# Patient Record
Sex: MALE | Race: WHITE | Employment: FULL TIME | URBAN - METROPOLITAN AREA
[De-identification: names, ages, dates, MRNs, and addresses within clinical notes are randomized per-mention and may not be internally consistent; named-entity substitution may affect disease eponyms.]

---

## 2017-02-28 ENCOUNTER — MED REC SCAN ONLY (OUTPATIENT)
Dept: FAMILY MEDICINE CLINIC | Facility: CLINIC | Age: 47
End: 2017-02-28

## 2017-04-03 ENCOUNTER — TELEPHONE (OUTPATIENT)
Dept: FAMILY MEDICINE CLINIC | Facility: CLINIC | Age: 47
End: 2017-04-03

## 2017-05-17 ENCOUNTER — OFFICE VISIT (OUTPATIENT)
Dept: FAMILY MEDICINE CLINIC | Facility: CLINIC | Age: 47
End: 2017-05-17

## 2017-05-17 VITALS
HEIGHT: 69.5 IN | RESPIRATION RATE: 14 BRPM | WEIGHT: 271 LBS | HEART RATE: 60 BPM | BODY MASS INDEX: 39.24 KG/M2 | TEMPERATURE: 98 F | DIASTOLIC BLOOD PRESSURE: 84 MMHG | SYSTOLIC BLOOD PRESSURE: 128 MMHG

## 2017-05-17 DIAGNOSIS — K40.90 INGUINAL HERNIA, LEFT: Primary | ICD-10-CM

## 2017-05-17 DIAGNOSIS — R10.9 ABDOMINAL PAIN, LEFT LATERAL: ICD-10-CM

## 2017-05-17 PROCEDURE — 99213 OFFICE O/P EST LOW 20 MIN: CPT | Performed by: FAMILY MEDICINE

## 2017-05-17 RX ORDER — PREDNISOLONE ACETATE 10 MG/ML
SUSPENSION/ DROPS OPHTHALMIC 4 TIMES DAILY
COMMUNITY

## 2017-05-17 NOTE — PROGRESS NOTES
Tere Chaparro is a 48year old male.   HPI:   Richard Castle has had some left inguinal pain after  He did some lifting and remodeling and has since then has had LLQ abd pain and some nausea, but it does not last very long, no previous injury or issues his j (Temporal)  Resp 14  Ht 69.5\"  Wt 271 lb  BMI 39.46 kg/m2  GENERAL: well developed, well nourished,in no apparent distress  SKIN: no rashes,no suspicious lesions  HEENT: atraumatic, normocephalic,ears and throat are clear  NECK: supple,no adenopathy,no br

## 2017-05-19 ENCOUNTER — TELEPHONE (OUTPATIENT)
Dept: FAMILY MEDICINE CLINIC | Facility: CLINIC | Age: 47
End: 2017-05-19

## 2017-05-19 ENCOUNTER — OFFICE VISIT (OUTPATIENT)
Dept: FAMILY MEDICINE CLINIC | Facility: CLINIC | Age: 47
End: 2017-05-19

## 2017-05-19 ENCOUNTER — TELEPHONE (OUTPATIENT)
Dept: SURGERY | Facility: CLINIC | Age: 47
End: 2017-05-19

## 2017-05-19 VITALS
SYSTOLIC BLOOD PRESSURE: 130 MMHG | WEIGHT: 275 LBS | BODY MASS INDEX: 40 KG/M2 | DIASTOLIC BLOOD PRESSURE: 106 MMHG | TEMPERATURE: 98 F

## 2017-05-19 DIAGNOSIS — K40.90 UNILATERAL INGUINAL HERNIA WITHOUT OBSTRUCTION OR GANGRENE, RECURRENCE NOT SPECIFIED: Primary | ICD-10-CM

## 2017-05-19 DIAGNOSIS — K40.90 NON-RECURRENT UNILATERAL INGUINAL HERNIA WITHOUT OBSTRUCTION OR GANGRENE: Primary | ICD-10-CM

## 2017-05-19 PROCEDURE — 99244 OFF/OP CNSLTJ NEW/EST MOD 40: CPT | Performed by: SURGERY

## 2017-05-19 NOTE — PROGRESS NOTES
Naseem Flores is a 48year old male  Patient presents with:  Consult: Dr Delbert Fritz. Consult for L inguinal hernia. Pt was lifting and twisting about a week ago and had pain. Pain today is at a 5.        REFERRED BY    Patient presents with Left inguinal h 0.0 oz/week       0 Standard drinks or equivalent per week       Comment: Social      ROS     GENERAL HEALTH: otherwise feels well, no weight loss, no fever or chills  SKIN: denies any unusual skin rashes or jaundice  HEENT: denies nasal congesti GFR Date: 08/11/2016   Value: 83  Ref Range >=60 Status: Final    Comment:   Estimated GFR units: mL/min/1.73 square meters   eGFR calculated by the CKD-EPI equation.        Calcium, Total Date: 08/11/2016   Value: 8.9  Ref Range 8.3-10.3 mg/dL Status: Fi Ratio Date: 08/11/2016   Value: 6.50* Ref Range <4.97 Status: Final    Comment:   Interpretation of CHOL/HDL ratios:      Male:          Female:          Risk:      3.43           3.27             One half average      4.97           4.44             Carlos 0. 10-0.60 x10(3) uL Status: Final   Eosinophil Absolute Date: 08/11/2016   Value: 0.08  Ref Range 0.00-0.30 x10(3) uL Status: Final   Basophil Absolute Date: 08/11/2016   Value: 0.04  Ref Range 0.00-0.10 x10(3) uL Status: Final   Immature Granulocyte Absol

## 2017-05-26 ENCOUNTER — TELEPHONE (OUTPATIENT)
Dept: SURGERY | Facility: CLINIC | Age: 47
End: 2017-05-26

## 2017-05-26 ENCOUNTER — TELEPHONE (OUTPATIENT)
Dept: FAMILY MEDICINE CLINIC | Facility: CLINIC | Age: 47
End: 2017-05-26

## 2017-05-26 NOTE — TELEPHONE ENCOUNTER
CPT: [id number], [id number], [id number]  DX: K40.90, K42.9    I called [facility] and spoke to [facility]. The above CPT codes do not require auth.  Ref # is [id number]. Martha Greenberg

## 2017-05-30 ENCOUNTER — CHARTING TRANS (OUTPATIENT)
Dept: OTHER | Age: 47
End: 2017-05-30

## 2017-05-30 RX ORDER — CLINDAMYCIN PHOSPHATE 900 MG/50ML
900 INJECTION INTRAVENOUS ONCE
Status: CANCELLED | OUTPATIENT
Start: 2017-05-30 | End: 2017-05-30

## 2017-06-01 ENCOUNTER — APPOINTMENT (OUTPATIENT)
Dept: LAB | Age: 47
End: 2017-06-01
Payer: COMMERCIAL

## 2017-06-01 DIAGNOSIS — K42.9 UMBILICAL HERNIA: ICD-10-CM

## 2017-06-01 PROCEDURE — 80048 BASIC METABOLIC PNL TOTAL CA: CPT

## 2017-06-01 PROCEDURE — 93005 ELECTROCARDIOGRAM TRACING: CPT

## 2017-06-01 PROCEDURE — 36415 COLL VENOUS BLD VENIPUNCTURE: CPT

## 2017-06-01 PROCEDURE — 93010 ELECTROCARDIOGRAM REPORT: CPT | Performed by: INTERNAL MEDICINE

## 2017-06-12 ENCOUNTER — ANESTHESIA (OUTPATIENT)
Dept: SURGERY | Facility: HOSPITAL | Age: 47
End: 2017-06-12
Payer: COMMERCIAL

## 2017-06-12 ENCOUNTER — HOSPITAL ENCOUNTER (OUTPATIENT)
Facility: HOSPITAL | Age: 47
Setting detail: HOSPITAL OUTPATIENT SURGERY
Discharge: HOME | End: 2017-06-12
Attending: SURGERY | Admitting: SURGERY
Payer: COMMERCIAL

## 2017-06-12 ENCOUNTER — ANESTHESIA EVENT (OUTPATIENT)
Dept: SURGERY | Facility: HOSPITAL | Age: 47
End: 2017-06-12
Payer: COMMERCIAL

## 2017-06-12 ENCOUNTER — SURGERY (OUTPATIENT)
Age: 47
End: 2017-06-12

## 2017-06-12 VITALS
HEIGHT: 70 IN | BODY MASS INDEX: 39.37 KG/M2 | DIASTOLIC BLOOD PRESSURE: 76 MMHG | OXYGEN SATURATION: 95 % | TEMPERATURE: 98 F | HEART RATE: 61 BPM | RESPIRATION RATE: 18 BRPM | SYSTOLIC BLOOD PRESSURE: 110 MMHG | WEIGHT: 275 LBS

## 2017-06-12 DIAGNOSIS — K40.90 NON-RECURRENT UNILATERAL INGUINAL HERNIA WITHOUT OBSTRUCTION OR GANGRENE: ICD-10-CM

## 2017-06-12 DIAGNOSIS — K42.9 UMBILICAL HERNIA: Primary | ICD-10-CM

## 2017-06-12 PROCEDURE — 0VBG4ZZ EXCISION OF LEFT SPERMATIC CORD, PERCUTANEOUS ENDOSCOPIC APPROACH: ICD-10-PCS | Performed by: SURGERY

## 2017-06-12 PROCEDURE — 0YU64JZ SUPPLEMENT LEFT INGUINAL REGION WITH SYNTHETIC SUBSTITUTE, PERCUTANEOUS ENDOSCOPIC APPROACH: ICD-10-PCS | Performed by: SURGERY

## 2017-06-12 PROCEDURE — 0WQF0ZZ REPAIR ABDOMINAL WALL, OPEN APPROACH: ICD-10-PCS | Performed by: SURGERY

## 2017-06-12 DEVICE — BARD MESH
Type: IMPLANTABLE DEVICE | Site: GROIN | Status: FUNCTIONAL
Brand: BARD MESH

## 2017-06-12 RX ORDER — ONDANSETRON 2 MG/ML
4 INJECTION INTRAMUSCULAR; INTRAVENOUS AS NEEDED
Status: DISCONTINUED | OUTPATIENT
Start: 2017-06-12 | End: 2017-06-12

## 2017-06-12 RX ORDER — SODIUM CHLORIDE, SODIUM LACTATE, POTASSIUM CHLORIDE, CALCIUM CHLORIDE 600; 310; 30; 20 MG/100ML; MG/100ML; MG/100ML; MG/100ML
INJECTION, SOLUTION INTRAVENOUS CONTINUOUS
Status: DISCONTINUED | OUTPATIENT
Start: 2017-06-12 | End: 2017-06-12

## 2017-06-12 RX ORDER — HYDROCODONE BITARTRATE AND ACETAMINOPHEN 5; 325 MG/1; MG/1
2 TABLET ORAL EVERY 4 HOURS PRN
Status: DISCONTINUED | OUTPATIENT
Start: 2017-06-12 | End: 2017-06-12

## 2017-06-12 RX ORDER — METOCLOPRAMIDE HYDROCHLORIDE 5 MG/ML
INJECTION INTRAMUSCULAR; INTRAVENOUS
Status: DISCONTINUED | OUTPATIENT
Start: 2017-06-12 | End: 2017-06-12 | Stop reason: HOSPADM

## 2017-06-12 RX ORDER — HYDROCODONE BITARTRATE AND ACETAMINOPHEN 5; 325 MG/1; MG/1
1 TABLET ORAL EVERY 4 HOURS PRN
Status: DISCONTINUED | OUTPATIENT
Start: 2017-06-12 | End: 2017-06-12

## 2017-06-12 RX ORDER — KETOROLAC TROMETHAMINE 30 MG/ML
30 INJECTION, SOLUTION INTRAMUSCULAR; INTRAVENOUS EVERY 6 HOURS PRN
Status: DISCONTINUED | OUTPATIENT
Start: 2017-06-12 | End: 2017-06-12

## 2017-06-12 RX ORDER — BUPIVACAINE HYDROCHLORIDE 5 MG/ML
INJECTION, SOLUTION EPIDURAL; INTRACAUDAL AS NEEDED
Status: DISCONTINUED | OUTPATIENT
Start: 2017-06-12 | End: 2017-06-12 | Stop reason: HOSPADM

## 2017-06-12 RX ORDER — MIDAZOLAM HYDROCHLORIDE 1 MG/ML
1 INJECTION INTRAMUSCULAR; INTRAVENOUS EVERY 5 MIN PRN
Status: DISCONTINUED | OUTPATIENT
Start: 2017-06-12 | End: 2017-06-12

## 2017-06-12 RX ORDER — AMLODIPINE BESYLATE 5 MG/1
5 TABLET ORAL DAILY
COMMUNITY

## 2017-06-12 RX ORDER — NALOXONE HYDROCHLORIDE 0.4 MG/ML
80 INJECTION, SOLUTION INTRAMUSCULAR; INTRAVENOUS; SUBCUTANEOUS AS NEEDED
Status: DISCONTINUED | OUTPATIENT
Start: 2017-06-12 | End: 2017-06-12

## 2017-06-12 RX ORDER — HYDROMORPHONE HYDROCHLORIDE 1 MG/ML
INJECTION, SOLUTION INTRAMUSCULAR; INTRAVENOUS; SUBCUTANEOUS
Status: COMPLETED
Start: 2017-06-12 | End: 2017-06-12

## 2017-06-12 RX ORDER — HEPARIN SODIUM 5000 [USP'U]/ML
5000 INJECTION, SOLUTION INTRAVENOUS; SUBCUTANEOUS ONCE
Status: COMPLETED | OUTPATIENT
Start: 2017-06-12 | End: 2017-06-12

## 2017-06-12 RX ORDER — HYDROCODONE BITARTRATE AND ACETAMINOPHEN 5; 325 MG/1; MG/1
1-2 TABLET ORAL
Qty: 30 TABLET | Refills: 0 | Status: SHIPPED | OUTPATIENT
Start: 2017-06-12 | End: 2017-06-27 | Stop reason: ALTCHOICE

## 2017-06-12 RX ORDER — ONDANSETRON 2 MG/ML
INJECTION INTRAMUSCULAR; INTRAVENOUS
Status: DISCONTINUED | OUTPATIENT
Start: 2017-06-12 | End: 2017-06-12 | Stop reason: HOSPADM

## 2017-06-12 RX ORDER — HYDROCODONE BITARTRATE AND ACETAMINOPHEN 5; 325 MG/1; MG/1
TABLET ORAL
Status: DISCONTINUED
Start: 2017-06-12 | End: 2017-06-12

## 2017-06-12 RX ORDER — KETOROLAC TROMETHAMINE 30 MG/ML
15 INJECTION, SOLUTION INTRAMUSCULAR; INTRAVENOUS EVERY 6 HOURS PRN
Status: DISCONTINUED | OUTPATIENT
Start: 2017-06-12 | End: 2017-06-12

## 2017-06-12 RX ORDER — DEXAMETHASONE SODIUM PHOSPHATE 4 MG/ML
VIAL (ML) INJECTION
Status: DISCONTINUED | OUTPATIENT
Start: 2017-06-12 | End: 2017-06-12 | Stop reason: HOSPADM

## 2017-06-12 RX ORDER — HYDROMORPHONE HYDROCHLORIDE 1 MG/ML
0.4 INJECTION, SOLUTION INTRAMUSCULAR; INTRAVENOUS; SUBCUTANEOUS EVERY 5 MIN PRN
Status: DISCONTINUED | OUTPATIENT
Start: 2017-06-12 | End: 2017-06-12

## 2017-06-12 RX ORDER — MEPERIDINE HYDROCHLORIDE 25 MG/ML
12.5 INJECTION INTRAMUSCULAR; INTRAVENOUS; SUBCUTANEOUS AS NEEDED
Status: DISCONTINUED | OUTPATIENT
Start: 2017-06-12 | End: 2017-06-12

## 2017-06-12 NOTE — BRIEF OP NOTE
Pre-Operative Diagnosis: Non-recurrent unilateral inguinal hernia without obstruction or gangrene [K40.90]     Post-Operative Diagnosis: Non-recurrent unilateral inguinal hernia without obstruction or gangrene [K40.90]     Procedure Performed:   Procedu
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
## 2017-06-12 NOTE — H&P
Patient presents with Left inguinal hernia present for the past week and half  Pt was doing remodeling when the hernia occurred    He denies bulge    Also states he has an umbilical hernia present for many years    States the left groin hernia gives him feels well, no weight loss, no fever or chills  SKIN: denies any unusual skin rashes or jaundice  HEENT: denies nasal congestion, sinus pain or sore throat; hearing loss negative,    EYES , no diplopia or vision changes  RESPIRATORY: denies shortness of br    Estimated GFR units: mL/min/1.73 square meters           eGFR calculated by the CKD-EPI equation.        Calcium, Total  Date:  08/11/2016    Value:  8.9   Ref Range  8.3-10.3 mg/dL  Status:  Final      Comment:    Total Calciums are not corrected for e Date:  08/11/2016    Value:  59*  Ref Range  5-40 mg/dL  Status:  Final    Chol/HDL Ratio  Date:  08/11/2016    Value:  6.50*  Ref Range  <4.97  Status:  Final      Comment:    Interpretation of CHOL/HDL ratios:                                            08/11/2016    Value:  40.6   Ref Range  35.1-46.3 fL  Status:  Final    Neutrophil Absolute Prelim  Date:  08/11/2016    Value:  2.86   Ref Range  1.30-6.70 x10 (3) uL  Status:  Final    Neutrophil Absolute  Date:  08/11/2016    Value:  2.86   Ref Range  1

## 2017-06-12 NOTE — OPERATIVE REPORT
[address]    PATIENT'S NAME: Flip Lyn MALIA DESEAN   ATTENDING PHYSICIAN: Jennyfer Almazan D.O.   OPERATING PHYSICIAN: Jennyfer Almazan D.O.   PATIENT ACCOUNT#:   [de-identified]    LOCATION:  [address] [place] [address] [address] #:   [id number] point approximately 3 cm lateral to the internal ring. The preperitoneal space was developed and bluntly dissected medial to the level of the pubic tubercle and then Cedric's ligament down to the femoral vein.   There was no evidence of direct hernial defe

## 2017-06-12 NOTE — ANESTHESIA POSTPROCEDURE EVALUATION
[address] Patient Status:  Hospital Outpatient Surgery   Age/Gender 48year old male MRN [id number]   [facility] SURGERY Attending Chas Officer, [facility] Day # 0 PARRIS Mcfadden DO       Anesthesia Post-op Not

## 2017-06-15 ENCOUNTER — TELEPHONE (OUTPATIENT)
Dept: FAMILY MEDICINE CLINIC | Facility: CLINIC | Age: 47
End: 2017-06-15

## 2017-06-19 ENCOUNTER — TELEPHONE (OUTPATIENT)
Dept: FAMILY MEDICINE CLINIC | Facility: CLINIC | Age: 47
End: 2017-06-19

## 2017-06-20 ENCOUNTER — OFFICE VISIT (OUTPATIENT)
Dept: FAMILY MEDICINE CLINIC | Facility: CLINIC | Age: 47
End: 2017-06-20

## 2017-06-20 VITALS
DIASTOLIC BLOOD PRESSURE: 90 MMHG | BODY MASS INDEX: 39 KG/M2 | SYSTOLIC BLOOD PRESSURE: 120 MMHG | WEIGHT: 275 LBS | TEMPERATURE: 98 F

## 2017-06-20 DIAGNOSIS — K40.90 INGUINAL HERNIA WITHOUT OBSTRUCTION OR GANGRENE, RECURRENCE NOT SPECIFIED, UNSPECIFIED LATERALITY: Primary | ICD-10-CM

## 2017-06-20 PROCEDURE — 99024 POSTOP FOLLOW-UP VISIT: CPT | Performed by: SURGERY

## 2017-06-20 NOTE — PROGRESS NOTES
6/20/2017    Patient presents with:  Post-Op: S/p L inguinal herniorrhaphy. Healing well, denies fevers or d/c. [place] Tierney is status post a laparoscopic repair of a L inguinal hernia.  Patient is doing well with some mild incisional and inguin

## 2017-06-27 ENCOUNTER — OFFICE VISIT (OUTPATIENT)
Dept: FAMILY MEDICINE CLINIC | Facility: CLINIC | Age: 47
End: 2017-06-27

## 2017-06-27 VITALS
TEMPERATURE: 98 F | HEIGHT: 70 IN | WEIGHT: 266 LBS | BODY MASS INDEX: 38.08 KG/M2 | DIASTOLIC BLOOD PRESSURE: 62 MMHG | SYSTOLIC BLOOD PRESSURE: 110 MMHG

## 2017-06-27 DIAGNOSIS — K40.90 INGUINAL HERNIA WITHOUT OBSTRUCTION OR GANGRENE, RECURRENCE NOT SPECIFIED, UNSPECIFIED LATERALITY: Primary | ICD-10-CM

## 2017-06-27 PROCEDURE — 99024 POSTOP FOLLOW-UP VISIT: CPT | Performed by: SURGERY

## 2017-06-27 RX ORDER — BRINZOLAMIDE/BRIMONIDINE TARTRATE 10; 2 MG/ML; MG/ML
1 SUSPENSION/ DROPS OPHTHALMIC 3 TIMES DAILY
Refills: 1 | COMMUNITY
Start: 2017-05-25

## 2017-06-27 NOTE — PROGRESS NOTES
Patient complains of left groin pain following abdominal extension and twisting movement.   States this occurred a few days ago and has been improving since then but the pain has persisted to some degree on physical examination there is no evidence of recur

## 2017-08-18 ENCOUNTER — OFFICE VISIT (OUTPATIENT)
Dept: FAMILY MEDICINE CLINIC | Facility: CLINIC | Age: 47
End: 2017-08-18

## 2017-08-18 VITALS
WEIGHT: 265 LBS | TEMPERATURE: 98 F | BODY MASS INDEX: 38 KG/M2 | DIASTOLIC BLOOD PRESSURE: 96 MMHG | HEART RATE: 80 BPM | SYSTOLIC BLOOD PRESSURE: 138 MMHG | OXYGEN SATURATION: 98 %

## 2017-08-18 DIAGNOSIS — J01.10 ACUTE FRONTAL SINUSITIS, RECURRENCE NOT SPECIFIED: Primary | ICD-10-CM

## 2017-08-18 PROCEDURE — 99213 OFFICE O/P EST LOW 20 MIN: CPT | Performed by: PHYSICIAN ASSISTANT

## 2017-08-18 RX ORDER — AMOXICILLIN AND CLAVULANATE POTASSIUM 875; 125 MG/1; MG/1
1 TABLET, FILM COATED ORAL 2 TIMES DAILY
Qty: 20 TABLET | Refills: 0 | Status: SHIPPED | OUTPATIENT
Start: 2017-08-18 | End: 2017-08-28

## 2017-08-18 RX ORDER — ALBUTEROL SULFATE 90 UG/1
2 AEROSOL, METERED RESPIRATORY (INHALATION) EVERY 6 HOURS PRN
Qty: 1 INHALER | Refills: 1 | Status: SHIPPED | OUTPATIENT
Start: 2017-08-18

## 2017-08-18 RX ORDER — FLUTICASONE PROPIONATE 50 MCG
2 SPRAY, SUSPENSION (ML) NASAL DAILY
Qty: 1 BOTTLE | Refills: 3 | Status: SHIPPED | OUTPATIENT
Start: 2017-08-18 | End: 2017-09-19 | Stop reason: ALTCHOICE

## 2017-08-18 NOTE — PROGRESS NOTES
CHIEF COMPLAINT:   Patient presents with:  Cough: x 2 weeks, alternating dry/productive. Initially with mild fever.         HPI:   Bryant Bello is a 48year old male who presents c/o cough x 2 weeks, alternating NP/productive with green/clear sputum • Essential hypertension, benign 1/8/2009   • Exercise induced bronchospasm 3/29/2012   • Hypertension    • Liver laceration    • Shingles     right eye - has been going on for a couple of years   • Unspecified asthma(493.90) 4/13/2007   • Unspecified esse ASSESSMENT:   Acute frontal sinusitis, recurrence not specified  (primary encounter diagnosis)    PLAN:   1. Augmentin, Flonase per epic. Recommend avoidance of decongestants due to h/o HTN, advised may try OTC Coricidin HBP Prn cough.   Albuterol MDI prn The sinuses are air-filled spaces within the bones of the face. They connect to the inside of the nose. Sinusitis is an inflammation of the tissue lining the sinus cavity. Sinus inflammation can occur during a cold.  It can also be due to allergies to polle · Do not use nasal rinses or irrigation during an acute sinus infection, unless told to by your health care provider. Rinsing may spread the infection to other sinuses.   · Use acetaminophen or ibuprofen to control pain, unless another pain medicine was pre

## 2017-08-18 NOTE — PATIENT INSTRUCTIONS
1. Augmentin 875 mg twice daily for 10 days. 2.  Flonase as prescribed: 2 sprays in each nostril daily, or 1 spray in each nostril twice daily.   If you develop a nosebleed, stop medication and restart at half the dose (1 spray in each nostril daily) aft · Over-the-counter decongestants may be used unless a similar medicine was prescribed. Nasal sprays work the fastest. Use one that contains phenylephrine or oxymetazoline. First blow the nose gently. Then use the spray.  Do not use these medicines more ofte © 4629-3885 [address], [address] [place]. All rights reserved. This information is not intended as a substitute for professional medical care. Always follow your healthcare professional's instructions.

## 2017-09-08 ENCOUNTER — OFFICE VISIT (OUTPATIENT)
Dept: FAMILY MEDICINE CLINIC | Facility: CLINIC | Age: 47
End: 2017-09-08

## 2017-09-08 VITALS
RESPIRATION RATE: 20 BRPM | SYSTOLIC BLOOD PRESSURE: 122 MMHG | DIASTOLIC BLOOD PRESSURE: 84 MMHG | OXYGEN SATURATION: 97 % | HEART RATE: 86 BPM | TEMPERATURE: 98 F

## 2017-09-08 DIAGNOSIS — Z87.09 HX OF EXTRINSIC ASTHMA: ICD-10-CM

## 2017-09-08 DIAGNOSIS — J20.9 ACUTE BRONCHITIS, UNSPECIFIED ORGANISM: Primary | ICD-10-CM

## 2017-09-08 PROCEDURE — 99213 OFFICE O/P EST LOW 20 MIN: CPT | Performed by: NURSE PRACTITIONER

## 2017-09-08 RX ORDER — AZITHROMYCIN 250 MG/1
TABLET, FILM COATED ORAL
Qty: 6 TABLET | Refills: 0 | Status: SHIPPED | OUTPATIENT
Start: 2017-09-08 | End: 2017-09-19 | Stop reason: ALTCHOICE

## 2017-09-08 RX ORDER — METHYLPREDNISOLONE 4 MG/1
TABLET ORAL
Qty: 1 KIT | Refills: 0 | Status: SHIPPED | OUTPATIENT
Start: 2017-09-08 | End: 2017-09-19 | Stop reason: ALTCHOICE

## 2017-09-08 NOTE — PROGRESS NOTES
CHIEF COMPLAINT:   Patient presents with:  Cough: worsening x3 days        HPI:   Hank Olson is a 48year old male who presents for cough for  3  days.   Reports was seen about 3 weeks ago with sinus infection and cough, was on abx and did seem to • Abdominal pain, unspecified site 1/8/2009   • Asthma, exercise induced    • Benign paroxysmal positional vertigo 3/14/08    Removed 3/29/12, resolved   • Cholelithiasis    • Esophageal reflux 4/13/2007   • Essential hypertension, benign 1/8/2009   • Exer LYMPH: No cervical or supraclavicular lymphadenopathy. EXTREMITIES:  No clubbing, cyanosis, or edema.     ASSESSMENT AND PLAN:   Gisella Gamble is a 48year old male who presents with:     ASSESSMENT:  Acute bronchitis, unspecified organism  (primary · You may use over-the-counter medicines to control fever or pain, unless another medicine was prescribed.  (Note: If you have chronic liver or kidney disease or have ever had a stomach ulcer or gastrointestinal bleeding, talk with your healthcare provider · Worsening weakness, drowsiness, headache, or stiff neck  · Trouble breathing, wheezing, or pain with breathing  Date Last Reviewed: 9/13/2015  © 6663-3301 The [address] [place] [address], [address] [place] [address]. All rights reserved.  This inf

## 2017-09-19 ENCOUNTER — OFFICE VISIT (OUTPATIENT)
Dept: FAMILY MEDICINE CLINIC | Facility: CLINIC | Age: 47
End: 2017-09-19

## 2017-09-19 ENCOUNTER — TELEPHONE (OUTPATIENT)
Dept: FAMILY MEDICINE CLINIC | Facility: CLINIC | Age: 47
End: 2017-09-19

## 2017-09-19 VITALS
DIASTOLIC BLOOD PRESSURE: 100 MMHG | OXYGEN SATURATION: 98 % | BODY MASS INDEX: 38 KG/M2 | TEMPERATURE: 98 F | HEART RATE: 66 BPM | WEIGHT: 265 LBS | RESPIRATION RATE: 16 BRPM | SYSTOLIC BLOOD PRESSURE: 130 MMHG

## 2017-09-19 DIAGNOSIS — J98.01 BRONCHOSPASM: ICD-10-CM

## 2017-09-19 DIAGNOSIS — J40 BRONCHITIS: Primary | ICD-10-CM

## 2017-09-19 DIAGNOSIS — R06.00 DYSPNEA ON EXERTION: ICD-10-CM

## 2017-09-19 PROCEDURE — 99214 OFFICE O/P EST MOD 30 MIN: CPT | Performed by: FAMILY MEDICINE

## 2017-09-19 RX ORDER — PROMETHAZINE HYDROCHLORIDE AND CODEINE PHOSPHATE 6.25; 1 MG/5ML; MG/5ML
2.5 SYRUP ORAL EVERY 4 HOURS PRN
Qty: 118 ML | Refills: 0 | Status: SHIPPED | OUTPATIENT
Start: 2017-09-19 | End: 2017-10-05 | Stop reason: ALTCHOICE

## 2017-09-19 RX ORDER — PREDNISONE 10 MG/1
TABLET ORAL
Qty: 30 TABLET | Refills: 0 | Status: SHIPPED | OUTPATIENT
Start: 2017-09-19 | End: 2017-10-05 | Stop reason: ALTCHOICE
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
## 2017-09-19 NOTE — TELEPHONE ENCOUNTER
Pt notified by phone. Appt scheduled.     Future Appointments  Date Time Provider Rene Gaona   9/19/2017 4:30 PM Natalee Velazco [facility] Robles Brewer

## 2017-09-19 NOTE — TELEPHONE ENCOUNTER
Pt has been to our [facility] a couple times for a bad cough, He was told if it comes back again to see DS. Harriet would like to be seen today.    Please return call to [phone]

## 2017-10-18 ENCOUNTER — OFFICE VISIT (OUTPATIENT)
Dept: FAMILY MEDICINE CLINIC | Facility: CLINIC | Age: 47
End: 2017-10-18

## 2017-10-18 VITALS
DIASTOLIC BLOOD PRESSURE: 98 MMHG | WEIGHT: 263.63 LBS | RESPIRATION RATE: 20 BRPM | SYSTOLIC BLOOD PRESSURE: 140 MMHG | HEART RATE: 68 BPM | TEMPERATURE: 98 F | BODY MASS INDEX: 38 KG/M2

## 2017-10-18 DIAGNOSIS — IMO0001 OBESITY, CLASS II, BMI 35.0-39.9, WITH COMORBIDITY (SEE ACTUAL BMI): ICD-10-CM

## 2017-10-18 DIAGNOSIS — R42 VERTIGO: ICD-10-CM

## 2017-10-18 DIAGNOSIS — I10 ESSENTIAL HYPERTENSION, BENIGN: Primary | ICD-10-CM

## 2017-10-18 PROCEDURE — 99214 OFFICE O/P EST MOD 30 MIN: CPT | Performed by: FAMILY MEDICINE

## 2017-10-18 RX ORDER — LISINOPRIL AND HYDROCHLOROTHIAZIDE 12.5; 1 MG/1; MG/1
1 TABLET ORAL DAILY
Qty: 30 TABLET | Refills: 3 | Status: SHIPPED | OUTPATIENT
Start: 2017-10-18 | End: 2018-02-21

## 2017-10-18 NOTE — PROGRESS NOTES
Ge Maldonado is a 48year old male. HPI:   Patient presents for recheck of his hypertension. Pt has been taking medications as instructed, no medication side effects, home BP monitoring in the range of 400-466'C systolic and 'M diastolic. h Essential hypertension, benign 1/8/2009   • Exercise induced bronchospasm 3/29/2012   • Hypertension    • Liver laceration    • Shingles     right eye - has been going on for a couple of years   • Unspecified asthma(493.90) 4/13/2007   • Unspecified essent by mouth daily.            Imaging & Consults:  None

## 2017-10-30 ENCOUNTER — OFFICE VISIT (OUTPATIENT)
Dept: FAMILY MEDICINE CLINIC | Facility: CLINIC | Age: 47
End: 2017-10-30

## 2017-10-30 VITALS
SYSTOLIC BLOOD PRESSURE: 124 MMHG | HEART RATE: 76 BPM | BODY MASS INDEX: 37 KG/M2 | DIASTOLIC BLOOD PRESSURE: 72 MMHG | RESPIRATION RATE: 16 BRPM | TEMPERATURE: 98 F | WEIGHT: 261 LBS

## 2017-10-30 DIAGNOSIS — Z00.00 ROUTINE HEALTH MAINTENANCE: Primary | ICD-10-CM

## 2017-10-30 DIAGNOSIS — I10 ESSENTIAL HYPERTENSION, BENIGN: ICD-10-CM

## 2017-10-30 DIAGNOSIS — E66.9 OBESITY (BMI 35.0-39.9 WITHOUT COMORBIDITY): ICD-10-CM

## 2017-10-30 PROCEDURE — 90471 IMMUNIZATION ADMIN: CPT | Performed by: FAMILY MEDICINE

## 2017-10-30 PROCEDURE — 80061 LIPID PANEL: CPT | Performed by: FAMILY MEDICINE

## 2017-10-30 PROCEDURE — 36415 COLL VENOUS BLD VENIPUNCTURE: CPT | Performed by: FAMILY MEDICINE

## 2017-10-30 PROCEDURE — 80050 GENERAL HEALTH PANEL: CPT | Performed by: FAMILY MEDICINE

## 2017-10-30 PROCEDURE — 90686 IIV4 VACC NO PRSV 0.5 ML IM: CPT | Performed by: FAMILY MEDICINE

## 2017-10-30 PROCEDURE — 99214 OFFICE O/P EST MOD 30 MIN: CPT | Performed by: FAMILY MEDICINE

## 2017-10-30 NOTE — PROGRESS NOTES
Noelle Purcell is a 48year old male. HPI:   Patient presents for recheck of his hypertension.  Pt has been taking medications as instructed, no medication side effects, home BP monitoring in the range of 813'L-497'RQ systolic and 'P diastoli Diagnosis Date   • Abdominal pain, unspecified site 1/8/2009   • Asthma, exercise induced    • Benign paroxysmal positional vertigo 3/14/08    Removed 3/29/12, resolved   • Cholelithiasis    • Esophageal reflux 4/13/2007   • Essential hypertension, benig for NV for BP check  Discussed medication side effects and expected course       Meds & Refills for this Visit:  No prescriptions requested or ordered in this encounter    Imaging & Consults:  FLULAVAL INFLUENZA VACCINE QUAD PRESERVATIVE FREE 0.5 ML

## 2017-10-31 ENCOUNTER — TELEPHONE (OUTPATIENT)
Dept: FAMILY MEDICINE CLINIC | Facility: CLINIC | Age: 47
End: 2017-10-31

## 2017-10-31 DIAGNOSIS — E78.00 ELEVATED CHOLESTEROL: Primary | ICD-10-CM

## 2017-10-31 NOTE — TELEPHONE ENCOUNTER
Detailed message left for pt on cell (ok per consent) with instructions to call with questions/concerns. Future order and pt reminder entered into Epic.

## 2017-10-31 NOTE — TELEPHONE ENCOUNTER
----- Message from Yon Stuart DO sent at 10/31/2017  8:13 AM CDT -----  Can notify [facility] his labs look very good,except  his cholesterol is better but not quite where I would like it yet.  But as he continues to lose weight and exercise it too should impr

## 2018-01-31 ENCOUNTER — PATIENT OUTREACH (OUTPATIENT)
Dept: FAMILY MEDICINE CLINIC | Facility: CLINIC | Age: 48
End: 2018-01-31

## 2018-02-09 ENCOUNTER — OFFICE VISIT (OUTPATIENT)
Dept: FAMILY MEDICINE CLINIC | Facility: CLINIC | Age: 48
End: 2018-02-09

## 2018-02-09 VITALS
BODY MASS INDEX: 36.4 KG/M2 | RESPIRATION RATE: 16 BRPM | DIASTOLIC BLOOD PRESSURE: 80 MMHG | WEIGHT: 260 LBS | SYSTOLIC BLOOD PRESSURE: 130 MMHG | HEART RATE: 96 BPM | TEMPERATURE: 98 F | HEIGHT: 71 IN | OXYGEN SATURATION: 99 %

## 2018-02-09 DIAGNOSIS — J40 BRONCHITIS: Primary | ICD-10-CM

## 2018-02-09 PROCEDURE — 99213 OFFICE O/P EST LOW 20 MIN: CPT | Performed by: NURSE PRACTITIONER

## 2018-02-09 RX ORDER — CODEINE PHOSPHATE AND GUAIFENESIN 10; 100 MG/5ML; MG/5ML
10 SOLUTION ORAL 3 TIMES DAILY PRN
Qty: 180 ML | Refills: 0 | Status: SHIPPED | OUTPATIENT
Start: 2018-02-09

## 2018-02-09 RX ORDER — PREDNISONE 20 MG/1
40 TABLET ORAL DAILY
Qty: 10 TABLET | Refills: 0 | Status: SHIPPED | OUTPATIENT
Start: 2018-02-09 | End: 2018-02-14

## 2018-02-09 NOTE — PROGRESS NOTES
CHIEF COMPLAINT:   Patient presents with:  Cough/URI: x 7 days        HPI:   Jessica Maldonado is a 50year old male who presents for cough for  8  days. Cough started gradually and is described as tight and deep. Patient has history of bronchitis.  This • Unspecified essential hypertension       Social History:  Smoking status: Never Smoker                                                              Smokeless tobacco: Never Used                      Alcohol use: Yes           0.0 - 0.6 oz/week     [place] Educated on not driving while taking cough syrup  Educated on s/s of worsening sx and when to seek higher level of care  Follow up with pcp if not improving    Meds & Refills for this Visit:  Signed Prescriptions Disp Refills    guaiFENesin-codeine (Hansen Magic · You may use over-the-counter medicine to control fever or pain, unless another pain medicine was prescribed. If you have chronic liver or kidney disease or have ever had a stomach ulcer or gastrointestinal bleeding, talk with your healthcare provider bef © 1939-5050 The [address]. [facility] [facility] [place]. All rights reserved. This information is not intended as a substitute for professional medical care. Always follow your healthcare professional's instructions.             The

## 2018-02-22 RX ORDER — LISINOPRIL AND HYDROCHLOROTHIAZIDE 12.5; 1 MG/1; MG/1
1 TABLET ORAL DAILY
Qty: 30 TABLET | Refills: 0 | Status: SHIPPED | OUTPATIENT
Start: 2018-02-22 | End: 2018-03-10

## 2018-03-02 ENCOUNTER — TELEPHONE (OUTPATIENT)
Dept: FAMILY MEDICINE CLINIC | Facility: CLINIC | Age: 48
End: 2018-03-02

## 2018-03-10 RX ORDER — LISINOPRIL AND HYDROCHLOROTHIAZIDE 12.5; 1 MG/1; MG/1
1 TABLET ORAL DAILY
Qty: 90 TABLET | Refills: 2 | Status: SHIPPED | OUTPATIENT
Start: 2018-03-10 | End: 2018-03-12

## 2018-03-12 ENCOUNTER — PATIENT MESSAGE (OUTPATIENT)
Dept: FAMILY MEDICINE CLINIC | Facility: CLINIC | Age: 48
End: 2018-03-12

## 2018-03-12 ENCOUNTER — TELEPHONE (OUTPATIENT)
Dept: FAMILY MEDICINE CLINIC | Facility: CLINIC | Age: 48
End: 2018-03-12

## 2018-03-12 RX ORDER — LISINOPRIL AND HYDROCHLOROTHIAZIDE 12.5; 1 MG/1; MG/1
1 TABLET ORAL 2 TIMES DAILY
Qty: 90 TABLET | Refills: 2 | Status: SHIPPED | OUTPATIENT
Start: 2018-03-12 | End: 2018-03-13

## 2018-03-12 NOTE — TELEPHONE ENCOUNTER
From: Cristela Vargas  To: Kirit Elizalde DO  Sent: 3/12/2018 10:49 AM CDT  Subject: Prescription Question    Hi Dr Cesar Herrera, per our last exchange I have been taking two Lisiniprol per day and my blood pressure has been really good (120's/60's).  I ran ou

## 2018-03-12 NOTE — TELEPHONE ENCOUNTER
Spoke with pharmacist and advised that we adjusted how this pt takes his medication.   Verbalized understanding

## 2018-03-12 NOTE — TELEPHONE ENCOUNTER
Quantity and direction don't quite sync.  Saysn to take twice a day, in past pt has taken once daily

## 2018-03-12 NOTE — TELEPHONE ENCOUNTER
Currently taking Lisinopril- HCTZ 10-12.5mg  Take 1 tab by mouth daily.     Pt states he has been taking BID- okay to send new script so insurance will refill/    Last refill 3/10/18  #90 2 RF

## 2018-03-13 RX ORDER — LISINOPRIL AND HYDROCHLOROTHIAZIDE 25; 20 MG/1; MG/1
1 TABLET ORAL DAILY
Qty: 90 TABLET | Refills: 3 | Status: SHIPPED | OUTPATIENT
Start: 2018-03-13

## 2018-04-07 ENCOUNTER — TELEPHONE (OUTPATIENT)
Dept: FAMILY MEDICINE CLINIC | Facility: CLINIC | Age: 48
End: 2018-04-07

## 2018-04-07 NOTE — TELEPHONE ENCOUNTER
Per email from the spouse the pt is going to [facility] and needs a script for typhoid and an order to come and get Hep A Vaccine      dilshad 41/77

## 2018-04-09 ENCOUNTER — TELEPHONE (OUTPATIENT)
Dept: FAMILY MEDICINE CLINIC | Facility: CLINIC | Age: 48
End: 2018-04-09

## 2018-04-09 NOTE — TELEPHONE ENCOUNTER
Spoke with wife and advised that vaccination order is in and that oral vaccine was sent to pharmacy.     Future Appointments  Date Time Provider Rene Gaona   4/10/2018 9:00 AM  [facility] [address] [facility] Octavio Hodges

## 2018-04-10 ENCOUNTER — NURSE ONLY (OUTPATIENT)
Dept: FAMILY MEDICINE CLINIC | Facility: CLINIC | Age: 48
End: 2018-04-10

## 2018-04-10 PROCEDURE — 90471 IMMUNIZATION ADMIN: CPT | Performed by: FAMILY MEDICINE

## 2018-04-10 PROCEDURE — 90632 HEPA VACCINE ADULT IM: CPT | Performed by: FAMILY MEDICINE

## 2018-04-10 NOTE — PROGRESS NOTES
Pt received 1st Hep A dose in L Deltoid. Pt tolerated and was sent home in stable condition. Pt was advised to return to clinic for second dose on or after 10/10/18.  Pt verbalized understanding

## 2018-04-27 ENCOUNTER — PATIENT MESSAGE (OUTPATIENT)
Dept: FAMILY MEDICINE CLINIC | Facility: CLINIC | Age: 48
End: 2018-04-27

## 2018-04-28 NOTE — TELEPHONE ENCOUNTER
From: Joanna Vargas  To: Mulugeta Sood DO  Sent: 4/27/2018 8:56 PM CDT  Subject: Prescription Question    I have been having problems sleeping lately and was wondering if I could get a RX for Yasmeen Marquez so I can use them occasionally? Thanks!

## 2018-04-30 RX ORDER — ZOLPIDEM TARTRATE 5 MG/1
5 TABLET ORAL NIGHTLY PRN
Qty: 15 TABLET | Refills: 0 | Status: SHIPPED | OUTPATIENT
Start: 2018-04-30

## 2019-03-25 ENCOUNTER — TELEPHONE (OUTPATIENT)
Dept: FAMILY MEDICINE CLINIC | Facility: CLINIC | Age: 49
End: 2019-03-25

## 2019-03-25 DIAGNOSIS — Z12.11 SCREENING FOR MALIGNANT NEOPLASM OF COLON: Primary | ICD-10-CM

## 2019-04-08 RX ORDER — LISINOPRIL AND HYDROCHLOROTHIAZIDE 12.5; 1 MG/1; MG/1
1 TABLET ORAL DAILY
Qty: 90 TABLET | Refills: 3 | Status: SHIPPED | OUTPATIENT
Start: 2019-04-08 | End: 2019-05-14

## 2019-04-24 ENCOUNTER — TELEPHONE (OUTPATIENT)
Dept: FAMILY MEDICINE CLINIC | Facility: CLINIC | Age: 49
End: 2019-04-24

## 2019-05-14 ENCOUNTER — TELEPHONE (OUTPATIENT)
Dept: FAMILY MEDICINE CLINIC | Facility: CLINIC | Age: 49
End: 2019-05-14

## 2019-05-14 RX ORDER — LISINOPRIL AND HYDROCHLOROTHIAZIDE 12.5; 1 MG/1; MG/1
1 TABLET ORAL DAILY
Qty: 90 TABLET | Refills: 3 | Status: SHIPPED | OUTPATIENT
Start: 2019-05-14 | End: 2020-04-23

## 2019-05-14 NOTE — TELEPHONE ENCOUNTER
[facility]ill [facility] sent over refill request for medication    LOV: 10/30/17   Last Refill: 4/8/19 #90 3 RF [facility] in [place]

## 2020-04-23 RX ORDER — LISINOPRIL AND HYDROCHLOROTHIAZIDE 12.5; 1 MG/1; MG/1
1 TABLET ORAL DAILY
Qty: 90 TABLET | Refills: 2 | Status: SHIPPED | OUTPATIENT
Start: 2020-04-23 | End: 2021-01-13

## 2020-04-23 NOTE — TELEPHONE ENCOUNTER
Hypertension Medications Protocol Failed4/23 8:29 AM   CMP or BMP in past 12 months    Appointment in past 6 or next 3 months    Last serum creatinine< 2.0     LOV: 10/30/17  Last Refill: 5/14/19 #90 3 RF    No future appointments.     BP Readings from Last

## 2021-01-13 RX ORDER — LISINOPRIL AND HYDROCHLOROTHIAZIDE 12.5; 1 MG/1; MG/1
1 TABLET ORAL DAILY
Qty: 30 TABLET | Refills: 0 | Status: SHIPPED | OUTPATIENT
Start: 2021-01-13 | End: 2021-02-12

## 2021-01-13 NOTE — TELEPHONE ENCOUNTER
Hypertension Medications Protocol [id number]/13/2021 04:30 AM   CMP or BMP in past 12 months Protocol Details    Appointment in past 6 or next 3 months     Last serum creatinine< 2.0      LOV: 10/30/17   Last Refill: 4/23/20 #90 2 RF    No future appointment

## 2021-02-12 RX ORDER — LISINOPRIL AND HYDROCHLOROTHIAZIDE 12.5; 1 MG/1; MG/1
1 TABLET ORAL DAILY
Qty: 30 TABLET | Refills: 0 | OUTPATIENT
Start: 2021-02-12 | End: 2021-03-14

## 2021-02-12 NOTE — TELEPHONE ENCOUNTER
LOV 10/30/17. No future appointments. [facility] sent. Refill refused. Per previous message, that was his last refill until he is seen.

## 2022-11-29 ENCOUNTER — TELEPHONE (OUTPATIENT)
Dept: FAMILY MEDICINE CLINIC | Facility: CLINIC | Age: 52
End: 2022-11-29

## 2022-11-30 ENCOUNTER — PATIENT OUTREACH (OUTPATIENT)
Dept: CASE MANAGEMENT | Age: 52
End: 2022-11-30

## 2022-11-30 NOTE — PROCEDURES
The office order for PCP request is Approved and completed on November 30, 2022.     Thanks,  [facility] [employer]

## 2024-01-07 NOTE — PATIENT INSTRUCTIONS
Patient arrived per cart to 3B. Heart monitor applied and vitals taken. Heart rate sinus in 60s. Heparin infusion running at 15 units/kg/hr. Patient alert and oriented x4. Patient is compliant with wearing aspen collar at all times. Admission paperwork completed.  Explained to patient that [facility] is not responsible for any lost or stolen items.  Patient verbalized understanding. Oriented to room and use of call light and bed controls.  Patient denies pain or needs. No signs of distress noted.  Bed locked & in low position, side-rails up x2.  Call light in reach.  Reminded patient to call nurse if any needs arise.     2 person skin assessment performed by this nurse and Ju WEATHERS.    Explained patients right to have family, representative or physician notified of their admission.  Patient has Declined for physician to be notified.  Patient has Declined for family/representative to be notified.        Viral Bronchitis (Adult)    You have a viral bronchitis. Bronchitis is inflammation and swelling of the lining of the lungs. This is often caused by an infection. Symptoms include a dry, hacking cough that is worse at night.  The cough may bring up yellow · Over-the-counter cough, cold, and sore-throat medicines will not shorten the length of the illness, but they may help to reduce symptoms. Don't use decongestants if you have high blood pressure.   Follow-up care  Follow up with your healthcare provider, o

## (undated) DEVICE — GLOVE BIOGEL M SURG SZ 71/2

## (undated) DEVICE — #15 STERILE STAINLESS BLADE: Brand: STERILE STAINLESS BLADES

## (undated) DEVICE — GENERAL LAPAROS CDS-LF: Brand: MEDLINE INDUSTRIES, INC.

## (undated) DEVICE — SPONGE STICK WITH PVP-I: Brand: KENDALL

## (undated) DEVICE — SUTURE MONOCRYL 4-0 PS-2

## (undated) DEVICE — BREAST-HERNIA-PORT CDS-LF: Brand: MEDLINE INDUSTRIES, INC.

## (undated) DEVICE — KENDALL SCD EXPRESS SLEEVES, KNEE LENGTH, MEDIUM: Brand: KENDALL SCD

## (undated) DEVICE — CAPSURE PERMANENT FIXATION SYSTEM 30 PERMANENT FASTENERS: Brand: CAPSURE PERMANENT FIXATION SYSTEM

## (undated) DEVICE — CHLORAPREP 26ML APPLICATOR

## (undated) DEVICE — SUTURE ETHIBOND EXCEL 2-0 SH

## (undated) DEVICE — CORD MONOPOLAR DISP

## (undated) DEVICE — ENDOPATH XCEL DILATING TIP TROCARS WITH STABILITY SLEEVES: Brand: ENDOPATH XCEL

## (undated) DEVICE — SUTURE SILK 2-0 SH

## (undated) DEVICE — SUTURE VICRYL 3-0 SH

## (undated) DEVICE — Device

## (undated) DEVICE — ENDOPATH XCEL UNIVERSAL TROCAR STABLILITY SLEEVES: Brand: ENDOPATH XCEL

## (undated) DEVICE — ENDOPATH XCEL BLUNT TIP TROCARS WITH SMOOTH SLEEVES: Brand: ENDOPATH XCEL

## (undated) DEVICE — PLUMEPORT ACTIV LAPAROSCOPIC SMOKE FILTRATION DEVICE: Brand: PLUMEPORT ACTIVE

## (undated) DEVICE — VIOLET BRAIDED (POLYGLACTIN 910), SYNTHETIC ABSORBABLE SUTURE: Brand: COATED VICRYL

## (undated) DEVICE — DISSECTOR SONICISION CORDLESS

## (undated) DEVICE — CAUTERY PENCIL

## (undated) DEVICE — SOL  .9 1000ML BTL

## (undated) NOTE — Clinical Note
05/19/2017        [address]  [place] [address]      Dear Neeraj Jiménez,    [address] records indicate that you have outstanding lab work and or testing that was ordered for you and has not yet been completed:  Lab Frequency Next Oc

## (undated) NOTE — LETTER
Last Revised 02/07/06  Obstructive Sleep Apnea Questionnaire    Clinical signs and symptoms suggesting the possibility of GRIS    1. Predisposing physical characteristics (positive with any of the following present)  ? BMI 35kg/m²  ?  Craniofacial abnormalit pauses which are frightening to the observer, patient regularly falls asleep within minutes after being left unstimulated) in which case they should be treated as though they have severe sleep apnea.     The sleep laboratory’s assessment (none, mild, modera Point Total for B           C. Requirement for postoperative opioids.                Opioid requirement             Points   None 0    Low dose oral opiod 1    High dose oral opioids, parenteral or neuraxial opiods 3      Point Total for C        Estimation

## (undated) NOTE — LETTER
04/24/19        [address]  [place] [address]      Dear Tor Duke,    [facility] records indicate that you have outstanding lab work and or testing that was ordered for you and has not yet been completed:  Lab Frequency Next Occu

## (undated) NOTE — LETTER
01/31/18        [address] [place] [address] [address]  [place] [address]      Dear Lindy Collado.     To help us provide the highest quality medical care, [facility] uses a sophisticated computer system to track our patient recor

## (undated) NOTE — Clinical Note
ASTHMA ACTION PLAN for Hank Olson     : 1970     Date: 2017  Doctor:  Jethro Mccormick DO  Phone for doctor or clinic: [address] [place] [address]  [address] [phone]- Albuterol inhaler 2 puffs every 20 minutes for three treatments       Don't forget:  · Rinse mouth after using inhaler  · Use spacer for inhaler  · Remember to get your Flu vaccine every fall!      Asthma Action Plan reviewed with the caregiver and patient,

## (undated) NOTE — MR AVS SNAPSHOT
[address] [facility] [address] [phone]               Thank you for choosing us for your health care visit with Gabriele Herring DO.   We are glad to serve you and happy to provide you with this sum physician's office. At that time, you will be provided with any authorization numbers or be assured that none are required. You can then schedule your appointment.  Failure to obtain required authorization numbers can create reimbursement difficulties for y If you've recently had a stay at the Hospital you can access your discharge instructions in [employer] by going to Visits < Admission Summaries.  If you've been to the Emergency Department or your doctor's office, you can view your past visit information in My Visit [facility] online at  [email or web address]

## (undated) NOTE — LETTER
Justin Gomez [address] [place] [address] [place] [address]    3/2/2018      Dear  Justin Palmer    In order to provide the highest quality care, [facility] uses a sophisticated computer system to track our

## (undated) NOTE — LETTER
ASTHMA ACTION PLAN for Naseem Flores     : 1970     Date: 10/30/2017  Provider:  Miguel Robertson,   Phone for doctor or clinic: [facility], [address] [place] [address] [phone]

## (undated) NOTE — Clinical Note
04/03/2017        [address]  [place] [address]      Dear Beth Handy,    [facility] records indicate that you have outstanding fasting lab work and or testing that was ordered for you and has not yet been completed:  Lab Frequency

## (undated) NOTE — IP AVS SNAPSHOT
[facility] [place] [address] [place], [address] ~ [phone]                Discharge Summary   6/12/2017    Summer Vargas           Admission Information        Provider Department    6/12/2017 DO Lele Thomas Pre-Op / A - HYDROcodone-acetaminophen 5-325 MG Tabs              Patient Instructions       Home Care Instructions  Laparoscopic Inguinal Hernia      MEDICATIONS  For post-operative pain control the medications are usually Norco or Tylenol #3.   These are narcotics be removed in the office by the surgeon or nurse. Most wounds will be closed with dissolving suture underneath the skin. These sutures will dissolve on their own. ACTIVITY  Every day the patient should be up, showered and dressed.   Each day the patien Thank you for entrusting us with your care. EM[facility] General Surgery  General Post Op Instructions:  1. Do some nice big deep breathing-  ·  This can prevent pneumonia  2.  Ambulate:   · Get up and walk several times a day-not strenuous  · Do Foot pumps when yo Instructions and Information about Your Health     None      Follow-up Information     Follow up with Francheska Zuniga, DO In 1 week.     Specialty:  SURGERY, GENERAL    Why:  For post op follow up, You may see one of the Chanda, Roxi Millard or [address] [place] [address] MyChart     Visit [employer]  You can access your MyChart to more actively manage your health care and view more details from this visit by going to [email or web address].   If you've recently had a stay at the Hospital you can access your discharge ins

## (undated) NOTE — MR AVS SNAPSHOT
[address] [facility] [address] [phone]               Thank you for choosing us for your health care visit with Dale Cardozo DO.   We are glad to serve you and happy to provide you with this conley discharge instructions in [employer] by going to Visits < Admission Summaries. If you've been to the Emergency Department or your doctor's office, you can view your past visit information in [employer] by going to Visits < Visit Summaries. [employer] questions?

## (undated) NOTE — MR AVS SNAPSHOT
[address] [facility] [id number] [phone]               Thank you for choosing us for your health care visit with Lindsay Michael DO.   We are glad to serve you and happy to provide you with this summary of your v MyChart     Visit [employer]  You can access your MyChart to more actively manage your health care and view more details from this visit by going to [email or web address].   If you've recently had [email or web address] stay at the Hospital you can access your discharge ins